# Patient Record
Sex: FEMALE | Race: WHITE | NOT HISPANIC OR LATINO | URBAN - NONMETROPOLITAN AREA
[De-identification: names, ages, dates, MRNs, and addresses within clinical notes are randomized per-mention and may not be internally consistent; named-entity substitution may affect disease eponyms.]

---

## 2017-01-05 RX ORDER — SIMVASTATIN 20 MG/1
20 TABLET, FILM COATED ORAL
Qty: 60 TAB | Refills: 5 | Status: SHIPPED | OUTPATIENT
Start: 2017-01-05

## 2017-01-05 NOTE — TELEPHONE ENCOUNTER
Requested Prescriptions     Pending Prescriptions Disp Refills    simvastatin (ZOCOR) 20 mg tablet 60 Tab 5     Sig: Take 1 Tab by mouth nightly.

## 2017-03-20 NOTE — PATIENT DISCUSSION
(Q08.956) Other secondary cataract, bilateral - Assesment : Mild posterior capsule opacification present OU. - Plan : Monitor for Changes. Advised patient to call our office with decreased vision or increased symptoms.  Final RX for glasses given RTC 1 year exam/MAC OCT

## 2017-03-20 NOTE — PATIENT DISCUSSION
(H35.363) Drusen (degenerative) of macula, bilateral - Assesment : Examination revealed few hard drusen. Fundus photo macula performed today. - Plan : Monitor for changes. Advised patient to call our office with decreased vision or increased symptoms.

## 2017-03-20 NOTE — PATIENT DISCUSSION
(H35.371) Puckering of macula, right eye - Assesment : Examination revealed ERM. Fundus Photo Macula performed today - Plan : Monitor. Advised patient to call our office with decreased vision or increased symptoms.  RTC 1 year exam/OCT MAC

## 2017-03-20 NOTE — PATIENT DISCUSSION
(E58.118) Vitreous degeneration, bilateral - Assesment : Examination revealed PVD - Plan : Monitor for changes. Advised patient to call our office with decreased vision or an increase in flashes and/or floaters.

## 2017-03-20 NOTE — PATIENT DISCUSSION
(E11.9) Type 2 diabetes mellitus without complications - Assesment : Patient has type II diabetes with no ocular  complications. No hemorrhages or signs of diabetic macular edema seen in either eye today. - Plan : Continue monitoring for changes. Call with decreased vision or floaters. Discussed importance of monitoring A1C and blood sugars with PCP. Try to keep A1C level below 6. 5.

## 2018-03-05 NOTE — PATIENT DISCUSSION
(E11.9) Type 2 diabetes mellitus without complications - Assesment : Patient has type II diabetes without complications. No hemorrhages or signs of diabetic macular edema seen in either eye today. - Plan : Continue monitoring for changes. Call with decreased vision or floaters. Discussed importance of monitoring A1C and blood sugars with PCP.   Keep A1C level below 6.5. 1 year exam with Dr. Kin Ann / Rosa M Kramer

## 2018-03-05 NOTE — PATIENT DISCUSSION
(S84.342) Other secondary cataract, bilateral - Assesment : Mild posterior capsule opacification present OU. OU: TRACE , OD: INFERIOR - Plan : Monitor for Changes. Advised patient to call our office with decreased vision or increased symptoms.

## 2018-03-05 NOTE — PATIENT DISCUSSION
(H35.363) Drusen (degenerative) of macula, bilateral - Assesment : Examination revealed few hard drusen. - Plan : Monitor for changes. Advised patient to call our office with decreased vision or increased symptoms.

## 2018-03-05 NOTE — PATIENT DISCUSSION
(H35.371) Puckering of macula, right eye - Assesment : Examination revealed ERM. - Plan : Monitor. Advised patient to call our office with decreased vision or increased symptoms.

## 2019-03-11 NOTE — PATIENT DISCUSSION
(Z02.097) Other secondary cataract, bilateral - Assesment : Mild posterior capsule opacification present OU. OU: TRACE , OD: INFERIOR - Plan : Monitor for Changes. Advised patient to call our office with decreased vision or increased symptoms.

## 2019-03-11 NOTE — PATIENT DISCUSSION
(T45.763) Vitreous degeneration, bilateral - Assesment : Examination revealed PVD - Plan : Monitor for changes. Advised patient to call our office with decreased vision or an increase in flashes and/or floaters.

## 2019-03-11 NOTE — PATIENT DISCUSSION
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

## 2019-03-11 NOTE — PATIENT DISCUSSION
(E11.9) Type 2 diabetes mellitus without complications - Assesment : Patient has type II diabetes without complications. No hemorrhages or signs of diabetic macular edema seen in either eye today. - Plan : Continue monitoring for changes. Call with decreased vision or floaters. Discussed importance of monitoring A1C and blood sugars with PCP.   Keep A1C level below 6.5. 1 year exam with Dr. Floyd No

## 2019-03-11 NOTE — PATIENT DISCUSSION
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  -MEDIAL IN LASH LINE - Plan : OBSERVATION

## 2019-06-14 LAB — HBA1C MFR BLD HPLC: 7.1 %

## 2019-06-18 ENCOUNTER — IMPORTED ENCOUNTER (OUTPATIENT)
Dept: URBAN - NONMETROPOLITAN AREA CLINIC 1 | Facility: CLINIC | Age: 69
End: 2019-06-18

## 2019-06-18 PROBLEM — E11.9: Noted: 2019-06-18

## 2019-06-18 PROBLEM — H52.13: Noted: 2019-06-18

## 2019-06-18 PROBLEM — H40.013: Noted: 2019-06-18

## 2019-06-18 PROBLEM — H52.223: Noted: 2019-06-18

## 2019-06-18 PROBLEM — H52.4: Noted: 2019-06-18

## 2019-06-18 PROCEDURE — 92015 DETERMINE REFRACTIVE STATE: CPT

## 2019-06-18 PROCEDURE — 92014 COMPRE OPH EXAM EST PT 1/>: CPT

## 2019-06-18 NOTE — PATIENT DISCUSSION
Mixed Astigmatism OU w/Presbyopia-  discussed findings w/patient-  new spectacle Rx iussued-  monitor yearly or prn Borderline Glaucoma OU-  discussed findings w/patient-  IOPs are stable at 18 19-  C/D's 0.7/0.7 and 0.75/0.75-  optic nerves are large-  RTC at 6 mo w/ OCT ONH and Pach or prnType 2 DM w/o Retinopathy OU-  discussed findings w/patient-  no retinopathy noted at this time-  continue to tightly control BS-  continue to see MD as scheduled-  RTC 6 mo or prn; Dr's Notes: MR DOW

## 2019-12-17 ENCOUNTER — IMPORTED ENCOUNTER (OUTPATIENT)
Dept: URBAN - NONMETROPOLITAN AREA CLINIC 1 | Facility: CLINIC | Age: 69
End: 2019-12-17

## 2019-12-17 PROCEDURE — 99213 OFFICE O/P EST LOW 20 MIN: CPT

## 2019-12-17 NOTE — PATIENT DISCUSSION
Borderline Glaucoma OU-  discussed findings w/patient-  IOPs are stable at 18 OU-  C/D's 0.7/0.7 and 0.75/0.75-  optic nerves are large-  6 mo Complete w/OCT ON and PachType 2 DM w/o Retinopathy OU-  discussed findings w/patient-  no retinopathy noted at this time-  continue to tightly control BS-  continue to see MD as scheduled-  RTC 6 mo Complete or prn; Dr's Notes: MR DOW

## 2019-12-20 PROBLEM — H52.13: Noted: 2019-12-20

## 2019-12-20 PROBLEM — H40.013: Noted: 2019-12-20

## 2019-12-20 PROBLEM — H52.223: Noted: 2019-12-20

## 2019-12-20 PROBLEM — E11.9: Noted: 2019-12-20

## 2019-12-20 PROBLEM — H52.4: Noted: 2019-12-20

## 2020-01-14 ENCOUNTER — IMPORTED ENCOUNTER (OUTPATIENT)
Dept: URBAN - NONMETROPOLITAN AREA CLINIC 1 | Facility: CLINIC | Age: 70
End: 2020-01-14

## 2020-01-14 PROCEDURE — 99213 OFFICE O/P EST LOW 20 MIN: CPT

## 2020-01-14 PROCEDURE — 76514 ECHO EXAM OF EYE THICKNESS: CPT

## 2020-01-14 PROCEDURE — 92133 CPTRZD OPH DX IMG PST SGM ON: CPT

## 2020-01-14 NOTE — PATIENT DISCUSSION
Borderline Glaucoma OU-  discussed findings w/patient-  IOPs are stable at 17 OU-  C/D's 0.7/0.7 and 0.75/0.75-  drance heme resolved-  optic nerves are large-  OCT ONH done 1/14/2020 OD: 10/10 SS 93 um normal RNFL all quadrants OS: 9/10 SS 97 um normal RNFL all quadrants -  Pach done 1/14/2020 531 530-  June 2020 Routine Type 2 DM w/o Retinopathy OU-  discussed findings w/patient-  no retinopathy noted at this time-  continue to tightly control BS-  continue to see MD as scheduled-  RTC June 2020 Routine or prn; Dr's Notes: [facility] 1/14/2020OCT Markell Uribe 73 1/14/2020Pach 1/14/2020 [place]

## 2020-07-30 ENCOUNTER — IMPORTED ENCOUNTER (OUTPATIENT)
Dept: URBAN - NONMETROPOLITAN AREA CLINIC 1 | Facility: CLINIC | Age: 70
End: 2020-07-30

## 2020-07-30 PROCEDURE — 92015 DETERMINE REFRACTIVE STATE: CPT

## 2020-07-30 PROCEDURE — 92014 COMPRE OPH EXAM EST PT 1/>: CPT

## 2020-07-30 NOTE — PATIENT DISCUSSION
Mixed Astigmatism OU w/Presbyopia -  discussed findings w/patient-  new spectacle Rx issued-  continue to monitor yearly or prnBorderline Glaucoma OU-  discussed findings w/patient-  IOPs are stable at 17 OU-  C/D's 0.7/0.7 and 0.75/0.75-  (-)drance heme-  optic nerves are large-  OCT ONH done 1/14/2020 OD: 10/10 SS 93 um normal RNFL all quadrants OS: 9/10 SS 97 um normal RNFL all quadrants -  Pach done 1/14/2020 531 530-  RTC 6 mo f/u BL GL w/OCT ONHType 2 DM w/o Retinopathy OU-  discussed findings w/patient-  no retinopathy noted at this time-  continue to tightly control BS-  continue to see MD as scheduled-  RTC 6 mo f/u or prn; Dr's Notes: MR 7/30/2020DFE 7/30/2020EDELMIRA Uribe 73 1/14/2020Pach 1/14/2020 [place]

## 2020-08-01 PROBLEM — H52.223: Noted: 2020-08-01

## 2020-08-01 PROBLEM — H40.013: Noted: 2020-08-01

## 2020-08-01 PROBLEM — H52.4: Noted: 2020-08-01

## 2020-08-01 PROBLEM — E11.9: Noted: 2020-08-01

## 2020-08-01 PROBLEM — H52.03: Noted: 2020-08-01

## 2021-01-29 ENCOUNTER — IMPORTED ENCOUNTER (OUTPATIENT)
Dept: URBAN - NONMETROPOLITAN AREA CLINIC 1 | Facility: CLINIC | Age: 71
End: 2021-01-29

## 2021-01-29 ENCOUNTER — PREPPED CHART (OUTPATIENT)
Dept: URBAN - NONMETROPOLITAN AREA CLINIC 4 | Facility: CLINIC | Age: 71
End: 2021-01-29

## 2021-01-29 PROBLEM — H52.4: Noted: 2021-01-29

## 2021-01-29 PROBLEM — E11.9: Noted: 2021-01-29

## 2021-01-29 PROBLEM — H52.03: Noted: 2021-01-29

## 2021-01-29 PROBLEM — H40.013: Noted: 2021-01-29

## 2021-01-29 PROBLEM — H52.223: Noted: 2021-01-29

## 2021-01-29 PROCEDURE — 92133 CPTRZD OPH DX IMG PST SGM ON: CPT

## 2021-01-29 PROCEDURE — 99213 OFFICE O/P EST LOW 20 MIN: CPT

## 2021-01-29 NOTE — PATIENT DISCUSSION
Borderline Glaucoma OU-  discussed findings w/patient-  IOPs are stable at 17 OU-  C/D's 0.7/0.7 and 0.75/0.75-  (-)drance heme-  optic nerves are large-  OCT ONH done 1/29/2021    OD: 8/10 SS 87 um normal RNFL all quadrants     OS: 9/10 SS 96 um normal RNFL all quadrants -  Pach done 1/14/2020 531 530-  RTC 6 mo EP Routine Eye Exam; Dr's Notes: MR 7/30/2020DFE 7/30/2020OCT ONH 1/29/2021Pach  019

## 2021-08-21 NOTE — PATIENT DISCUSSION
(Low Risk) Patient is considered low risk. Condition was discussed with patient and patient understands. Will continue to monitor patient for any progression in condition. Patient was advised to call us with any problems, questions, or concerns.
(Observe) Observe for now without intervention. The patient was advised to contact office with any change or worsening of vision.
Discussed the importance of blood sugar control in the prevention of ocular complications.
No active Diabetic Retinopathy is present on examination.
Observe for change.
Patient given Rx for glasses.
Patient understands condition, prognosis and need for follow up care.
Retinal exam findings communicated to Physician managing diabetes.
General

## 2022-02-07 ENCOUNTER — COMPREHENSIVE EXAM (OUTPATIENT)
Dept: URBAN - NONMETROPOLITAN AREA CLINIC 4 | Facility: CLINIC | Age: 72
End: 2022-02-07

## 2022-02-07 DIAGNOSIS — E11.9: ICD-10-CM

## 2022-02-07 DIAGNOSIS — H52.4: ICD-10-CM

## 2022-02-07 PROCEDURE — 92015 DETERMINE REFRACTIVE STATE: CPT

## 2022-02-07 PROCEDURE — 92014 COMPRE OPH EXAM EST PT 1/>: CPT

## 2022-02-07 ASSESSMENT — TONOMETRY
OS_IOP_MMHG: 17
OD_IOP_MMHG: 16

## 2022-02-07 ASSESSMENT — VISUAL ACUITY
OS_SC: 20/30
OU_SC: 20/25-2
OD_PH: 20/30
OD_SC: 20/40
OU_CC: J1+

## 2022-04-09 ASSESSMENT — PACHYMETRY
OS_CT_UM: 548; ADJ: THIN
OD_CT_UM: 543; ADJ: THIN
OS_CT_UM: 548; ADJ: THIN
OS_CT_UM: 530; ADJ: THIN
OD_CT_UM: 531; ADJ: THIN
OS_CT_UM: 530; ADJ: THIN
OD_CT_UM: 531; ADJ: THIN
OD_CT_UM: 543; ADJ: THIN

## 2022-04-09 ASSESSMENT — VISUAL ACUITY
OU_SC: 20/30+3
OS_SC: 20/25+2
OD_CC: 20/70
OU_CC: J2
OD_SC: 20/50+2
OD_SC: 20/40+2
OS_CC: 20/30
OD_PH: 20/25
OS_CC: 20/40
OS_SC: 20/40
OD_GLARE: 20/400
OD_SC: 20/30
OS_SC: 20/30-1
OD_PH: 20/30+2
OS_GLARE: 20/80
OU_SC: 20/25
OD_PH: 20/30
OU_SC: 20/25
OS_GLARE: 20/200
OD_CC: 20/40-2
OS_SC: 20/30

## 2022-04-09 ASSESSMENT — TONOMETRY
OS_IOP_MMHG: 18
OD_IOP_MMHG: 18
OS_IOP_MMHG: 17
OD_IOP_MMHG: 17
OS_IOP_MMHG: 17
OS_IOP_MMHG: 16
OD_IOP_MMHG: 16
OS_IOP_MMHG: 18

## 2022-07-27 ENCOUNTER — ESTABLISHED PATIENT (OUTPATIENT)
Dept: URBAN - NONMETROPOLITAN AREA CLINIC 4 | Facility: CLINIC | Age: 72
End: 2022-07-27

## 2022-07-27 NOTE — PATIENT DISCUSSION
Observe for change. Glycopyrrolate Pregnancy And Lactation Text: This medication is Pregnancy Category B and is considered safe during pregnancy. It is unknown if it is excreted breast milk.